# Patient Record
(demographics unavailable — no encounter records)

---

## 2025-03-20 NOTE — PHYSICAL EXAM
[Healthy Appearing] : healthy appearing [Well Nourished] : well nourished [Interactive] : interactive [Normal Appearance] : normal appearance [Well formed] : well formed [Normally Set] : normally set [Normal S1 and S2] : normal S1 and S2 [Clear to Ausculation Bilaterally] : clear to auscultation bilaterally [Abdomen Soft] : soft [Abdomen Tenderness] : non-tender [] : no hepatosplenomegaly [1] : was Kirill stage 1 [Moderate] : moderate [Kirill Stage ___] : the Kirill stage for breast development was [unfilled] [Normal] : normal  [Murmur] : no murmurs

## 2025-03-20 NOTE — CONSULT LETTER
[Dear  ___] : Dear  [unfilled], [Consult Letter:] : I had the pleasure of evaluating your patient, [unfilled]. [Please see my note below.] : Please see my note below. [Consult Closing:] : Thank you very much for allowing me to participate in the care of this patient.  If you have any questions, please do not hesitate to contact me. [Sincerely,] : Sincerely, [FreeTextEntry2] : MARIETTA MALAGON [FreeTextEntry3] : Kadeem Ma MD

## 2025-03-20 NOTE — PAST MEDICAL HISTORY
[At ___ Weeks Gestation] : at [unfilled] weeks gestation [Normal Vaginal Route] : by normal vaginal route [None] : there were no delivery complications [Speech & Motor Delay] : patient has speech and motor delay  [de-identified] : Gestational diabetes and hyperension in mother [FreeTextEntry1] : 6 lb 14 oz

## 2025-03-20 NOTE — PAST MEDICAL HISTORY
[At ___ Weeks Gestation] : at [unfilled] weeks gestation [Normal Vaginal Route] : by normal vaginal route [None] : there were no delivery complications [Speech & Motor Delay] : patient has speech and motor delay  [de-identified] : Gestational diabetes and hyperension in mother [FreeTextEntry1] : 6 lb 14 oz

## 2025-03-20 NOTE — HISTORY OF PRESENT ILLNESS
[Headaches] : no headaches [Visual Symptoms] : no ~T visual symptoms [Polyuria] : no polyuria [Polydipsia] : no polydipsia [Constipation] : no constipation [FreeTextEntry2] : OFELIA presents with her mother for evaluation of her growth and axillary hair growth.  Specifically, they have noted the development of axillary hair and an increase in perspiration with a pronounced odor. Her mother reports that Doretha had been using Albuterol and Ventolin for her Asthma, but only occasionally. She also mentioned that Doretha feels a tingling sensation but was not sure if it was significant.  Growth chart shows growth along the 5th percentile from age 3 to 7 years.  Since age 7 years, the height percentile has drifted downward.  The growth failure appears to have been associated with rapid weight gain with the weight percentile increasing from 5th percentile at age 2 between the 25th and 50th percentile by 10.  Mother attributes the weight gain to an increased appetite.  However, James has good eating habits and does not drink sugar containing drinks. Blood work from January 2025 showed normal thyroid function with a TSH of 2.99 IU/mL and free T4 1.05 ng/deciliter.  The vitamin D level was low at 22 ng/mL.  The CBC and CMP were unremarkable. Mother was not told about the low vit D and she is not taking any supplemental vit D.  - Past Medical History : Doretha has a medical history of Asthma, which appears to be under control. She takes Albuterol and Ventolin infrequently, only as needed. There is no history of hospitalization or surgeries. - Family History : - Distant female relatives on both the maternal and paternal sides were of smaller stature - Her older sibling suffers from depression